# Patient Record
Sex: FEMALE | Race: WHITE | Employment: OTHER | URBAN - METROPOLITAN AREA
[De-identification: names, ages, dates, MRNs, and addresses within clinical notes are randomized per-mention and may not be internally consistent; named-entity substitution may affect disease eponyms.]

---

## 2018-05-15 ENCOUNTER — HOSPITAL ENCOUNTER (OUTPATIENT)
Dept: GENERAL RADIOLOGY | Age: 53
Discharge: HOME | End: 2018-05-15
Attending: INTERNAL MEDICINE
Payer: MEDICARE

## 2018-05-15 DIAGNOSIS — R05.9 COUGH: ICD-10-CM

## 2018-05-15 PROCEDURE — 71046 X-RAY EXAM CHEST 2 VIEWS: CPT

## 2021-01-07 VITALS
DIASTOLIC BLOOD PRESSURE: 70 MMHG | BODY MASS INDEX: 9.96 KG/M2 | SYSTOLIC BLOOD PRESSURE: 126 MMHG | WEIGHT: 62 LBS | HEIGHT: 66 IN | TEMPERATURE: 98.6 F

## 2021-01-07 PROBLEM — K59.00 CONSTIPATION: Status: ACTIVE | Noted: 2021-01-07

## 2021-01-07 PROBLEM — G80.9 CEREBRAL PALSY (HCC): Status: ACTIVE | Noted: 2021-01-07

## 2021-01-07 PROBLEM — N31.9 NEUROGENIC BLADDER: Status: ACTIVE | Noted: 2021-01-07

## 2021-01-07 PROBLEM — N39.0 CHRONIC URINARY TRACT INFECTION: Status: ACTIVE | Noted: 2021-01-07

## 2021-01-07 PROBLEM — N20.0 KIDNEY STONE: Status: ACTIVE | Noted: 2021-01-07

## 2021-01-07 RX ORDER — DOCUSATE SODIUM 100 MG/1
100 CAPSULE, LIQUID FILLED ORAL 2 TIMES DAILY
COMMUNITY

## 2021-01-07 RX ORDER — SODIUM, POTASSIUM,MAG SULFATES 17.5-3.13G
177 SOLUTION, RECONSTITUTED, ORAL ORAL
COMMUNITY

## 2021-01-07 RX ORDER — MAGNESIUM HYDROXIDE 1200 MG/15ML
1 LIQUID ORAL
COMMUNITY

## 2021-01-07 RX ORDER — CALCIUM CARBONATE/VITAMIN D3 250-3.125
1 TABLET ORAL DAILY
COMMUNITY

## 2021-01-07 RX ORDER — ONDANSETRON 4 MG/1
4 TABLET, FILM COATED ORAL
COMMUNITY

## 2021-01-07 RX ORDER — GUAIFENESIN/DEXTROMETHORPHAN 100-10MG/5
5 SYRUP ORAL
COMMUNITY

## 2021-01-07 RX ORDER — FLUTICASONE PROPIONATE 50 MCG
2 SPRAY, SUSPENSION (ML) NASAL DAILY
COMMUNITY

## 2021-01-07 RX ORDER — CEPHALEXIN 250 MG/1
500 CAPSULE ORAL 4 TIMES DAILY
COMMUNITY

## 2021-01-07 RX ORDER — LORATADINE 10 MG/1
10 TABLET ORAL
COMMUNITY

## 2021-01-07 RX ORDER — BACLOFEN 10 MG/1
TABLET ORAL 3 TIMES DAILY
COMMUNITY

## 2021-01-07 RX ORDER — IRON POLYSACCHARIDE COMPLEX 150 MG
150 CAPSULE ORAL 2 TIMES DAILY
COMMUNITY

## 2021-01-07 RX ORDER — OMEPRAZOLE 20 MG/1
20 CAPSULE, DELAYED RELEASE ORAL DAILY
COMMUNITY

## 2021-01-07 RX ORDER — ERGOCALCIFEROL 1.25 MG/1
50000 CAPSULE ORAL
COMMUNITY

## 2021-01-07 RX ORDER — POLYETHYLENE GLYCOL 3350, SODIUM CHLORIDE, SODIUM BICARBONATE, POTASSIUM CHLORIDE 420; 11.2; 5.72; 1.48 G/4L; G/4L; G/4L; G/4L
4000 POWDER, FOR SOLUTION ORAL
COMMUNITY

## 2021-01-07 RX ORDER — STARCH
POWDER (GRAM) ORAL
COMMUNITY

## 2021-01-07 RX ORDER — CLOTRIMAZOLE AND BETAMETHASONE DIPROPIONATE 10; .64 MG/G; MG/G
CREAM TOPICAL 2 TIMES DAILY
COMMUNITY

## 2021-01-07 RX ORDER — AMOXICILLIN 500 MG/1
500 CAPSULE ORAL
COMMUNITY

## 2021-01-07 RX ORDER — ASCORBIC ACID 500 MG
TABLET ORAL
COMMUNITY

## 2021-01-07 RX ORDER — ACETAMINOPHEN 500 MG
TABLET ORAL
COMMUNITY

## 2021-01-22 ENCOUNTER — TELEPHONE (OUTPATIENT)
Dept: UROLOGY | Age: 56
End: 2021-01-22

## 2021-01-22 NOTE — TELEPHONE ENCOUNTER
Pharmacy is requesting refill of cephalexin 250mg- [facility] pharmacy 2002 \[facility] [place] [facility]\"" notes scanned into chart she has not been seen since 02/12/2020

## 2021-02-23 ENCOUNTER — TELEPHONE (OUTPATIENT)
Dept: UROLOGY | Age: 56
End: 2021-02-23

## 2021-02-23 NOTE — TELEPHONE ENCOUNTER
Pharmacy called in regards to Mrs. Moreno, in regards to her prescription refill of Potassium Citrate er 10 ( I believe mg or me ) it was hard to make out what she said, and she takes it 2x a day.

## 2021-02-23 NOTE — TELEPHONE ENCOUNTER
We have told the pharmacy multiple times we are not refilling that medication until she has an apt and she has canceled. Please refer back to other pt cases.

## 2021-03-19 NOTE — TELEPHONE ENCOUNTER
Pt caregiver called about needed a refill on pt medication Potassium Citrate 10 mg and sent to [address] [place] [address] Road on [facility]. I told him referring back to previous notes that pt would need to see the doctor  before she could get any refills since she no showed her appt on 2/2021.  I rescheduled her 1 yr f/u and medication refill for 4/28 in the [facility] office

## 2022-03-18 PROBLEM — G80.9 CEREBRAL PALSY (HCC): Status: ACTIVE | Noted: 2021-01-07

## 2022-03-19 PROBLEM — N20.0 KIDNEY STONE: Status: ACTIVE | Noted: 2021-01-07

## 2022-03-19 PROBLEM — N31.9 NEUROGENIC BLADDER: Status: ACTIVE | Noted: 2021-01-07

## 2022-03-19 PROBLEM — N39.0 CHRONIC URINARY TRACT INFECTION: Status: ACTIVE | Noted: 2021-01-07

## 2022-03-20 PROBLEM — K59.00 CONSTIPATION: Status: ACTIVE | Noted: 2021-01-07

## 2023-02-27 ENCOUNTER — TELEPHONE (OUTPATIENT)
Dept: UROLOGY | Age: 58
End: 2023-02-27

## 2023-02-27 NOTE — TELEPHONE ENCOUNTER
Group home that patient is in called, no name left or name of the home, to inform us that they are with [address] now and not [facility] pharmacy, all scripts to be sent to [facility]. Their phone is [phone] and fax is [phone].     Group home's number is